# Patient Record
Sex: FEMALE | Race: WHITE
[De-identification: names, ages, dates, MRNs, and addresses within clinical notes are randomized per-mention and may not be internally consistent; named-entity substitution may affect disease eponyms.]

---

## 2018-03-23 ENCOUNTER — HOSPITAL ENCOUNTER (EMERGENCY)
Dept: HOSPITAL 17 - NEPC | Age: 83
LOS: 1 days | Discharge: HOME | End: 2018-03-24
Payer: MEDICARE

## 2018-03-23 VITALS
DIASTOLIC BLOOD PRESSURE: 90 MMHG | OXYGEN SATURATION: 99 % | HEART RATE: 76 BPM | RESPIRATION RATE: 18 BRPM | SYSTOLIC BLOOD PRESSURE: 186 MMHG

## 2018-03-23 VITALS — WEIGHT: 110.23 LBS | BODY MASS INDEX: 19.53 KG/M2 | HEIGHT: 63 IN

## 2018-03-23 DIAGNOSIS — E03.9: ICD-10-CM

## 2018-03-23 DIAGNOSIS — E78.00: ICD-10-CM

## 2018-03-23 DIAGNOSIS — I10: ICD-10-CM

## 2018-03-23 DIAGNOSIS — I48.91: ICD-10-CM

## 2018-03-23 DIAGNOSIS — R06.02: ICD-10-CM

## 2018-03-23 DIAGNOSIS — F41.9: ICD-10-CM

## 2018-03-23 DIAGNOSIS — R11.2: Primary | ICD-10-CM

## 2018-03-23 DIAGNOSIS — R07.9: ICD-10-CM

## 2018-03-23 DIAGNOSIS — K21.9: ICD-10-CM

## 2018-03-23 LAB
BASOPHILS # BLD AUTO: 0 TH/MM3 (ref 0–0.2)
BASOPHILS NFR BLD: 0.3 % (ref 0–2)
BUN SERPL-MCNC: 16 MG/DL (ref 7–18)
CALCIUM SERPL-MCNC: 8.5 MG/DL (ref 8.5–10.1)
CHLORIDE SERPL-SCNC: 105 MEQ/L (ref 98–107)
CREAT SERPL-MCNC: 1.22 MG/DL (ref 0.5–1)
EOSINOPHIL # BLD: 0.1 TH/MM3 (ref 0–0.4)
EOSINOPHIL NFR BLD: 2.4 % (ref 0–4)
ERYTHROCYTE [DISTWIDTH] IN BLOOD BY AUTOMATED COUNT: 14.2 % (ref 11.6–17.2)
GFR SERPLBLD BASED ON 1.73 SQ M-ARVRAT: 42 ML/MIN (ref 89–?)
GLUCOSE SERPL-MCNC: 101 MG/DL (ref 74–106)
HCO3 BLD-SCNC: 28 MEQ/L (ref 21–32)
HCT VFR BLD CALC: 37.3 % (ref 35–46)
HGB BLD-MCNC: 12.8 GM/DL (ref 11.6–15.3)
LYMPHOCYTES # BLD AUTO: 1 TH/MM3 (ref 1–4.8)
LYMPHOCYTES NFR BLD AUTO: 23.1 % (ref 9–44)
MAGNESIUM SERPL-MCNC: 2 MG/DL (ref 1.5–2.5)
MCH RBC QN AUTO: 33.4 PG (ref 27–34)
MCHC RBC AUTO-ENTMCNC: 34.4 % (ref 32–36)
MCV RBC AUTO: 97 FL (ref 80–100)
MONOCYTE #: 0.4 TH/MM3 (ref 0–0.9)
MONOCYTES NFR BLD: 8.2 % (ref 0–8)
NEUTROPHILS # BLD AUTO: 2.9 TH/MM3 (ref 1.8–7.7)
NEUTROPHILS NFR BLD AUTO: 66 % (ref 16–70)
PLATELET # BLD: 148 TH/MM3 (ref 150–450)
PMV BLD AUTO: 8.6 FL (ref 7–11)
RBC # BLD AUTO: 3.84 MIL/MM3 (ref 4–5.3)
SODIUM SERPL-SCNC: 142 MEQ/L (ref 136–145)
WBC # BLD AUTO: 4.4 TH/MM3 (ref 4–11)

## 2018-03-23 PROCEDURE — 93005 ELECTROCARDIOGRAM TRACING: CPT

## 2018-03-23 PROCEDURE — 83735 ASSAY OF MAGNESIUM: CPT

## 2018-03-23 PROCEDURE — 71045 X-RAY EXAM CHEST 1 VIEW: CPT

## 2018-03-23 PROCEDURE — 85025 COMPLETE CBC W/AUTO DIFF WBC: CPT

## 2018-03-23 PROCEDURE — 84484 ASSAY OF TROPONIN QUANT: CPT

## 2018-03-23 PROCEDURE — 80048 BASIC METABOLIC PNL TOTAL CA: CPT

## 2018-03-23 NOTE — RADRPT
EXAM DATE/TIME:  03/23/2018 23:23 

 

[facility] COMPARISON:     

No previous studies available for comparison.

 

                     

INDICATIONS :     

Shortness of breath.

                     

 

MEDICAL HISTORY :     

Hypertension.       A-fib.   

 

SURGICAL HISTORY :     

Hysterectomy.   

 

ENCOUNTER:     

Initial                                        

 

ACUITY:     

1 day      

 

PAIN SCORE:     

0/10

 

LOCATION:     

Bilateral chest 

 

FINDINGS:     

A single view of the chest demonstrates the lungs to be symmetrically aerated without evidence of mas
s, infiltrate or effusion.  The cardiomediastinal contours are unremarkable.  Osseous structures are 
intact. There are multiple overlying electrocardiogram leads.

 

CONCLUSION:     No acute disease.  

 

 

 

 Art Osman MD on March 23, 2018 at 23:39           

Board Certified Radiologist.

 This report was verified electronically.

## 2018-03-24 LAB — TROPONIN I SERPL-MCNC: (no result) NG/ML (ref 0.02–0.05)

## 2018-03-24 NOTE — EKG
Date Performed: 2018       Time Performed: 23:08:55

 

PTAGE:      88 years

 

EKG:      SINUS BRADYCARDIA MODERATE ST DEPRESSION PROLONGED QT INTERVAL ABNORMAL ECG Since the 

 

 PREVIOUS TRACING            , no significant change noted PREVIOUS TRACIN2012 09.25

 

DOCTOR:   Hannah Barahona  Interpretating Date/Time  2018 19:56:30

## 2018-03-24 NOTE — PD
HPI


.


Chest pain and shortness of breath


Chief Complaint:  Cardiac Complaint


Time Seen by Provider:  23:16


Travel History


International Travel<30 days:  No


Contact w/Intl Traveler<30days:  No


Traveled to known affect area:  No





History of Present Illness


HPI


This patient presented to us via [facility] with chief complaint of chest pain and 

shortness of breath.  Onset was shortly prior to presentation.  The patient 

reports she was eating supper.  She suddenly felt very nauseous.  At about that 

same time, she had the chest pain and shortness of breath.  She then vomited.  

[facility] called and she was brought to the hospital.  She was given Zofran in route 

and her nausea is relieved.  She was also given aspirin by [facility].  Patient is 

feeling much better at this time.





PFSH


Past Medical History


Atrial Fibrillation:  Yes


Anxiety:  Yes


Heart Rhythm Problems:  Yes (FAST HEART RATE)


High Cholesterol:  Yes


Diminished Hearing:  No


GERD:  Yes


Hypertension:  Yes


Musculoskeletal:  Yes (CHRONIC BACK PAIN)


Neurologic:  Yes (PERIPHERAL NEUROPATHY)


Thyroid Disease:  Yes (HYPO)


Pregnant?:  Not Pregnant


Menopausal:  Yes





Past Surgical History


Hysterectomy:  Yes





Social History


Alcohol Use:  No


Tobacco Use:  No


Substance Use:  No





Allergies-Medications


(Allergen,Severity, Reaction):  


Coded Allergies:  


     naproxen (Unverified  Allergy, Unknown, Hives, 3/23/18)


Reported Meds & Prescriptions





Reported Meds & Active Scripts


Active


Golytely (Polyethylene Glycol/Electrolytes) 4,000 Ml Soln 4,000 Ml PO AS 

DIRECTED 


Reported


Lorazepam 1 Mg Tab 1 Mg PO HS 


Bactrim DS (Sulfamethoxazole-Trimethoprim DS) 1 Tab Tab 1 Tab PO BID 


Propafenone Hcl (Propafenone HCl) 150 Mg Tab 150 Mg PO TID 


Omeprazole 20 mg (Omeprazole) 20 Mg Tab 40 Mg PO DAILY 


Levoxyl (Levothyroxine Sodium) 75 Mcg Tab 112 Mcg PO DAILY 


Simvastatin 20 Mg Tab 20 Mg PO TID 








Review of Systems


Except as stated in HPI:  all other systems reviewed are Neg


General / Constitutional:  No: Fever, Chills


Cardiovascular:  Positive: Chest Pain or Discomfort


Respiratory:  Positive: Shortness of Breath


Gastrointestinal:  Positive: Nausea, Vomiting





Physical Exam


Narrative


GENERAL: Elderly female who is in no acute distress.


SKIN:  warm/dry.


HEAD: Normocephalic.  Atraumatic.


EYES: Pupils equal and round. No scleral icterus. No injection or drainage. 


ENT: No nasal bleeding or discharge.  Mucous membranes pink and moist.


NECK: Trachea midline.  Full range of motion without pain.. 


CARDIOVASCULAR: Regular rate and rhythm.  Heart sounds are normal.


RESPIRATORY: No accessory muscle use. Clear to auscultation. Breath sounds 

equal bilaterally. 


GASTROINTESTINAL: Abdomen soft.  Nontender.  Bowel sounds present.  

Nondistended.


MUSCULOSKELETAL: No obvious deformities. 


NEUROLOGICAL: Awake and alert. No obvious cranial nerve deficits.  Motor 

grossly within normal limits. Normal speech.


PSYCHIATRIC: Appropriate mood and affect; insight and judgment normal.





Data


Data


Last Documented VS





Vital Signs








  Date Time  Temp Pulse Resp B/P (MAP) Pulse Ox O2 Delivery O2 Flow Rate FiO2


 


3/23/18 23:07  76 18 186/90 [phone]   








Orders





 Orders


Electrocardiogram (3/23/18 23:16)


Basic Metabolic Panel (Bmp) (3/23/18 23:16)


Complete Blood Count With Diff (3/23/18 23:16)


Magnesium (Mg) (3/23/18 23:16)


Troponin I (3/23/18 23:16)


Chest, Single Ap (3/23/18 23:16)


Ecg Monitoring (3/23/18 23:16)


Iv Access Insert/Monitor (3/23/18 23:16)


Oximetry (3/23/18 23:16)


Sodium Chloride 0.9% Flush (Ns Flush) (3/23/18 23:30)


Lorazepam Inj (Ativan Inj) (3/23/18 23:30)





Labs





Laboratory Tests








Test


  3/23/18


23:20


 


White Blood Count 4.4 TH/MM3 


 


Red Blood Count 3.84 MIL/MM3 


 


Hemoglobin 12.8 GM/DL 


 


Hematocrit 37.3 % 


 


Mean Corpuscular Volume 97.0 FL 


 


Mean Corpuscular Hemoglobin 33.4 PG 


 


Mean Corpuscular Hemoglobin


Concent 34.4 % 


 


 


Red Cell Distribution Width 14.2 % 


 


Platelet Count 148 TH/MM3 


 


Mean Platelet Volume 8.6 FL 


 


Neutrophils (%) (Auto) 66.0 % 


 


Lymphocytes (%) (Auto) 23.1 % 


 


Monocytes (%) (Auto) 8.2 % 


 


Eosinophils (%) (Auto) 2.4 % 


 


Basophils (%) (Auto) 0.3 % 


 


Neutrophils # (Auto) 2.9 TH/MM3 


 


Lymphocytes # (Auto) 1.0 TH/MM3 


 


Monocytes # (Auto) 0.4 TH/MM3 


 


Eosinophils # (Auto) 0.1 TH/MM3 


 


Basophils # (Auto) 0.0 TH/MM3 


 


CBC Comment DIFF FINAL 


 


Differential Comment  


 


Blood Urea Nitrogen 16 MG/DL 


 


Creatinine 1.22 MG/DL 


 


Random Glucose 101 MG/DL 


 


Calcium Level 8.5 MG/DL 


 


Magnesium Level 2.0 MG/DL 


 


Sodium Level 142 MEQ/L 


 


Potassium Level 3.5 MEQ/L 


 


Chloride Level 105 MEQ/L 


 


Carbon Dioxide Level 28.0 MEQ/L 


 


Anion Gap 9 MEQ/L 


 


Estimat Glomerular Filtration


Rate 42 ML/MIN 


 


 


Troponin I


  LESS THAN 0.02


NG/ML











MDM


Medical Decision Making


Medical Screen Exam Complete:  Yes


Emergency Medical Condition:  Yes


Interpretation(s)


EKG shows a sinus rhythm with a rate of 56.  There is a lot of artifact.  Her 

most recent EKG was in 2012.  I do not see much difference between that EKG and 

today's.


Differential Diagnosis


Differential diagnosis of chest pain includes but is not limited to 

musculoskeletal pain, pulmonary embolism, acute coronary syndrome, pneumonia, 

pleurisy


Narrative Course


This patient presents with chest pain, shortness of breath, nausea followed by 

vomiting.  This all occurred after eating.  She was treated en route with 

Zofran and feels better.





CBC & BMP Diagram


3/23/18 23:20








Calcium Level 8.5, Magnesium Level 2.0





trop < 0.02





This patient has been fine since she got here.  The Zofran given by EMS seems 

to have treated her problem.  She will be discharged to home.





Diagnosis





 Primary Impression:  


 Nausea and vomiting


 Qualified Codes:  R11.2 - Nausea with vomiting, unspecified


 Additional Impressions:  


 Chest pain


 Qualified Codes:  R07.9 - Chest pain, unspecified


 Shortness of breath


Patient Instructions:  Acute Nausea and Vomiting (DC), General Instructions


Disposition:  01 DISCHARGE HOME


Condition:  Stable











Nivia Ibarra MD Mar 23, 2018 23:59